# Patient Record
Sex: FEMALE | Race: WHITE | NOT HISPANIC OR LATINO | URBAN - METROPOLITAN AREA
[De-identification: names, ages, dates, MRNs, and addresses within clinical notes are randomized per-mention and may not be internally consistent; named-entity substitution may affect disease eponyms.]

---

## 2018-01-01 ENCOUNTER — EMERGENCY (EMERGENCY)
Facility: HOSPITAL | Age: 63
LOS: 1 days | End: 2018-01-01
Attending: EMERGENCY MEDICINE | Admitting: EMERGENCY MEDICINE
Payer: COMMERCIAL

## 2018-01-01 PROCEDURE — 92950 HEART/LUNG RESUSCITATION CPR: CPT

## 2018-01-01 PROCEDURE — 99285 EMERGENCY DEPT VISIT HI MDM: CPT | Mod: 25

## 2018-09-23 NOTE — ED ADULT NURSE NOTE - CHIEF COMPLAINT QUOTE
Pt BIBA in cardiac arrest, witnessed arrest by daughter, CPR initiated by [facility] on scene. Initial rhythm Vfib, pt asystolic upon ED arrival, eddie device in place. Code team and RT at beside upon ED arrival. ACLS measures continue.

## 2018-09-23 NOTE — ED PROVIDER NOTE - PHYSICAL EXAMINATION
GEN: unresponsive, shonda airway in place. Tube is bloody. Of note, bag valve mask appears clean and fresh. oxygen tubing is still curled up and sealed with tape and is not connected to oxygen.  HEAD: NCAT  EYES: pupils 8mm fixed and dilated  ENT: shonda arway in mouth, blood in shonda tube and on oropharynx.   CV: pulseless  RESP: bagged bilateral breath sounds  GI: abdomen soft  MSK: no visible extremity trauma, IO in L-tibia  NEURO: GCS3, no spontaneous movement  SKIN: no bruising, no trauma

## 2018-09-23 NOTE — ED PROVIDER NOTE - PROGRESS NOTE DETAILS
ME Notifed, expecting call back. PCP Dr. Ana Borges's office notified. Spoke with VERENA Domínguez and he will notify Dr. Borges. They will sign the death certificate if ME notified.    Clarification by EMS. They report patient was hooked up to oxygen en route but the BVM got disconnected from the shonda tube when offloading from the ambulance and they quickly grabbed a new BVM to continue bagging her, which is why there was still tape on the oxygen tubing and not hooked up when she arrived. They report she was hooked up to oxygen through the entire transport. This total time without supplemental oxygen was <1 minute per the EMS crew.

## 2018-09-23 NOTE — ED PROVIDER NOTE - OBJECTIVE STATEMENT
This is a 67 year old female w/Hx of HTN, HLD who presents for cardiac arrest. She was in her usual state of health, visiting her son in [place] with her daughter Yany Giles when she developed a diarrheal illness and felt "sick" so chose to return home. On the drive home she became unresponsive and EMS was called. EMS arrived to find the patient in cardiac arrest in a shockable rhythm. BLS was started, ACLS crew arrived and placed a shonda airway and initiated ACLS. She was initially in Vfib and received 6 rounds of epinephrine, 300mg of amiodarone and 1 amp of bicarb prior to arrival. She had been in asystole for >10 minutes upon arrival, with approximately 40 minutes of CPR per the EMS crew. Time of arrival 10:21.

## 2018-09-23 NOTE — ED PROVIDER NOTE - MEDICAL DECISION MAKING DETAILS
Pt arrives in asystolic arrest. Immediately hooked up to oxygen. CPR continued and shonda tube exchanged for ETT. Pt given additional 2 rounds of epi, bicarb, calcium chloride. Regained Vfib. Never had a pulse. Daughter Yany Giles, a nurse, arrived at bedside and reported that her mother would never want to be kept alive on a ventillator. She voluntarily asked if her mother was at high risk for anoxic brain injury and we confirmed her suspicion as she had received well over 40 minutes of CPR without a pulse. She felt that even if we were to regain a pulse given the high likelihood of severe disability that her mother would not want to be kept alive under these circumstances and asked that we terminate CPR. Bedside cardiac ultrasound showed no organized cardiac activity. CPR was terminated at 10:30AM per the daughter's request. Tubes and drains kept in place pending ME. Quality and ME to be notified about question of CPR performed without supplemental oxygen.

## 2018-09-23 NOTE — ED PROVIDER NOTE - CRITICAL CARE PROVIDED
interpretation of diagnostic studies/direct patient care (not related to procedure)/documentation/consultation with medical examiner, discussion with PCP's office/consult w/ pt's family directly relating to pts condition/additional history taking

## 2020-10-06 NOTE — DISCHARGE NOTE FOR THE EXPIRED PATIENT - HOSPITAL COURSE
Pseudophakia OU - Pt BIBA, witnessed cardiac arrest by daughter. Pt was front seat passenger who informed daughter that she didn't feel well. Pt became unresponsive, CPR initiated by PD on scene. Upon EMS arrival pt was in V-fib, multiple shocks and epi's delivered by ems. ACLS measures continued in ED. Bedside sono completed by MD New showing no cardiac activity. Per daughter, Elinor's request, care withdrawn and CPR ended at 10:35, time of death 10:35, pronounced by MD New. Pt BIBA, witnessed cardiac arrest by daughter. Pt was front seat passenger who informed daughter that she didn't feel well. Pt became unresponsive, CPR initiated by PD on scene. Upon EMS arrival pt was in V-fib, multiple shocks and epi's delivered by ems. ACLS measures continued in ED. Bedside sono completed by MD New showing no cardiac activity. Per daughter, Caitlin's request, care withdrawn and CPR ended at 10:35, time of death 10:35, pronounced by MD New. Daughter Caitlin Jalloh [phone].
